# Patient Record
Sex: MALE | Race: WHITE | NOT HISPANIC OR LATINO | Employment: PART TIME | URBAN - METROPOLITAN AREA
[De-identification: names, ages, dates, MRNs, and addresses within clinical notes are randomized per-mention and may not be internally consistent; named-entity substitution may affect disease eponyms.]

---

## 2022-05-09 ENCOUNTER — HOSPITAL ENCOUNTER (OUTPATIENT)
Dept: RADIOLOGY | Facility: MEDICAL CENTER | Age: 62
End: 2022-05-09
Attending: UROLOGY
Payer: COMMERCIAL

## 2022-05-09 DIAGNOSIS — C61 PROSTATE CANCER (HCC): ICD-10-CM

## 2022-05-09 PROCEDURE — A9576 INJ PROHANCE MULTIPACK: HCPCS | Performed by: UROLOGY

## 2022-05-09 PROCEDURE — 72197 MRI PELVIS W/O & W/DYE: CPT

## 2022-05-09 PROCEDURE — 700111 HCHG RX REV CODE 636 W/ 250 OVERRIDE (IP): Performed by: RADIOLOGY

## 2022-05-09 PROCEDURE — 700117 HCHG RX CONTRAST REV CODE 255: Performed by: UROLOGY

## 2022-05-09 RX ADMIN — GADOTERIDOL 15 ML: 279.3 INJECTION, SOLUTION INTRAVENOUS at 17:38

## 2022-05-09 RX ADMIN — GLUCAGON 1 MG: 1 INJECTION, POWDER, LYOPHILIZED, FOR SOLUTION INTRAMUSCULAR; INTRAVENOUS at 16:59

## 2022-08-28 ENCOUNTER — HOSPITAL ENCOUNTER (OUTPATIENT)
Dept: RADIOLOGY | Facility: MEDICAL CENTER | Age: 62
End: 2022-08-28
Attending: UROLOGY
Payer: COMMERCIAL

## 2022-08-28 DIAGNOSIS — C61 MALIGNANT NEOPLASM OF PROSTATE (HCC): ICD-10-CM

## 2022-08-28 PROCEDURE — 72197 MRI PELVIS W/O & W/DYE: CPT

## 2022-08-28 PROCEDURE — A9576 INJ PROHANCE MULTIPACK: HCPCS | Performed by: PSYCHIATRY & NEUROLOGY

## 2022-08-28 PROCEDURE — 700111 HCHG RX REV CODE 636 W/ 250 OVERRIDE (IP): Performed by: RADIOLOGY

## 2022-08-28 PROCEDURE — 700117 HCHG RX CONTRAST REV CODE 255: Performed by: PSYCHIATRY & NEUROLOGY

## 2022-08-28 RX ADMIN — GADOTERIDOL 15 ML: 279.3 INJECTION, SOLUTION INTRAVENOUS at 09:00

## 2022-08-28 RX ADMIN — GLUCAGON 1 MG: 1 INJECTION, POWDER, LYOPHILIZED, FOR SOLUTION INTRAMUSCULAR; INTRAVENOUS at 07:48

## 2022-10-06 ENCOUNTER — HOSPITAL ENCOUNTER (OUTPATIENT)
Dept: RADIOLOGY | Facility: MEDICAL CENTER | Age: 62
End: 2022-10-06
Attending: INTERNAL MEDICINE
Payer: COMMERCIAL

## 2022-10-06 DIAGNOSIS — R07.9 CHEST PAIN, UNSPECIFIED TYPE: ICD-10-CM

## 2022-10-06 PROCEDURE — 700117 HCHG RX CONTRAST REV CODE 255: Performed by: INTERNAL MEDICINE

## 2022-10-06 PROCEDURE — A9577 INJ MULTIHANCE: HCPCS | Performed by: INTERNAL MEDICINE

## 2022-10-06 PROCEDURE — 75561 CARDIAC MRI FOR MORPH W/DYE: CPT

## 2022-10-06 RX ADMIN — GADOBENATE DIMEGLUMINE 15 ML: 529 INJECTION, SOLUTION INTRAVENOUS at 16:40

## 2023-09-16 ENCOUNTER — HOSPITAL ENCOUNTER (EMERGENCY)
Facility: MEDICAL CENTER | Age: 63
End: 2023-09-16
Attending: STUDENT IN AN ORGANIZED HEALTH CARE EDUCATION/TRAINING PROGRAM
Payer: COMMERCIAL

## 2023-09-16 VITALS
TEMPERATURE: 97.3 F | BODY MASS INDEX: 23.87 KG/M2 | OXYGEN SATURATION: 94 % | HEIGHT: 69 IN | WEIGHT: 161.16 LBS | SYSTOLIC BLOOD PRESSURE: 119 MMHG | RESPIRATION RATE: 15 BRPM | HEART RATE: 66 BPM | DIASTOLIC BLOOD PRESSURE: 73 MMHG

## 2023-09-16 DIAGNOSIS — H43.392 VITREOUS FLOATERS OF LEFT EYE: ICD-10-CM

## 2023-09-16 PROCEDURE — 99282 EMERGENCY DEPT VISIT SF MDM: CPT

## 2023-09-17 NOTE — ED PROVIDER NOTES
"CHIEF COMPLAINT  Chief Complaint   Patient presents with    Loss of Vision     Pt noticed floater in L eye  at 18:00 tonight and pt is now experiencing \"hazy vision\"        LIMITATION TO HISTORY   Select: None    HPI    Mayco Brandt is a 62 y.o. male who presents to the Emergency Department evaluation of floaters in his left eye.  He states around 6 PM he noticed that there was some dark spots in his left eye he felt like strands were falling in front of his eye, then they were at times swirling in front of his eye.  He denies any visual loss, no new eye pain, no trauma no falls.  He does wear glasses though denies any visual changes    OUTSIDE HISTORIAN(S):  Select: none    EXTERNAL RECORDS REVIEWED  Select: Other outpatient PCP visit 8/29/2023 patient has long COVID      PAST MEDICAL HISTORY  History reviewed. No pertinent past medical history.  .    SURGICAL HISTORY  History reviewed. No pertinent surgical history.      FAMILY HISTORY  History reviewed. No pertinent family history.       SOCIAL HISTORY  Social History     Socioeconomic History    Marital status: Single     Spouse name: Not on file    Number of children: Not on file    Years of education: Not on file    Highest education level: Not on file   Occupational History    Not on file   Tobacco Use    Smoking status: Never    Smokeless tobacco: Never   Substance and Sexual Activity    Alcohol use: Yes     Alcohol/week: 1.2 oz     Types: 2 Glasses of wine per week    Drug use: Not on file    Sexual activity: Not on file   Other Topics Concern    Not on file   Social History Narrative    Not on file     Social Determinants of Health     Financial Resource Strain: Not on file   Food Insecurity: Not on file   Transportation Needs: Not on file   Physical Activity: Not on file   Stress: Not on file   Social Connections: Not on file   Intimate Partner Violence: Not on file   Housing Stability: Not on file         CURRENT MEDICATIONS  No current " "facility-administered medications on file prior to encounter.     Current Outpatient Medications on File Prior to Encounter   Medication Sig Dispense Refill    levoFLOXacin (LEVAQUIN) 500 MG tablet              ALLERGIES  Allergies   Allergen Reactions    Mapap Sinus Maximum Strength [Eql Sinus Congestion-Pain]      Other reaction(s): Other (See Comments)  \"I just dont do well\"    Tylenol Sinus Max      Other reaction(s): Other (See Comments)  \"I just dont do well\" W/ tylenol only!       PHYSICAL EXAM  VITAL SIGNS:/73   Pulse 66   Temp 36.3 °C (97.3 °F) (Temporal)   Resp 15   Ht 1.753 m (5' 9\")   Wt 73.1 kg (161 lb 2.5 oz)   SpO2 94%   BMI 23.80 kg/m²       VITALS - vital signs documented prior to this note have been reviewed and noted,  GENERAL - awake, alert, oriented, GCS 15, no apparent distress, non-toxic  appearing  HEENT - normocephalic, atraumatic, pupils equal, sclera anicteric, mucus  membranes moist  Eye: Pupils are 3 and reactive bilaterally, there is no appreciable papilledema, cotton-wool spots, or hemorrhage on bedside funduscopic examination though this was inadequate as the pupils were not dilated.  I did perform a bedside ultrasound there is no evidence of underlying retinal detachment.  Visual acuities are 20/20 left 20/20 right 20/20 bilaterally with his correction  NECK - supple, no meningismus, full active range of motion, trachea midline  CARDIOVASCULAR - regular rate/rhythm, no murmurs/gallops/rubs  PULMONARY - no respiratory distress, speaking in full sentences, clear to  auscultation bilaterally, no wheezing/ronchi/rales, no accessory muscle use  GASTROINTESTINAL - soft, non-tender, non-distended, no rebound, guarding,      Radiologist interpretation:   No orders to display        COURSE & MEDICAL DECISION MAKING    ED COURSE:    ED Observation Status? No    Point of Care Ultrasound    ED POINT OF CARE ULTRASOUND: OCULAR    Indication: visual changes  Procedure:  Transverse and " longitudinal views of the left eye were obtained.  Evidence of retinal or vitreous detachment was not seen.  Vitreous hemorrhage was not seen.  Optic nerve sheath diameter (mm): 38    Image retained through [facility] as seen below:             Additional interpretation:    This study is a limited ultrasound examination performed and interpreted to evaluate for limited conditions as outlined above. There may be other clinically important information contained in the images that is outside this scope. When clinically warranted, a comprehensive ultrasound through the appropriate department is considered.         INITIAL ASSESSMENT, COURSE AND PLAN  Care Narrative: Patient presented for evaluation of visual changes feeling like spots were floating in front of his eyes.  He has no visual field deficits.  Visual acuities are intact in the emergency department.  Did perform a bedside ultrasound to evaluate for underlying retinal detachment versus vitreous detachment which did not reveal any evidence of retinal or vitreous detachment.  From his description these do seem consistent with floaters.  We will refer him to ophthalmology return precautions were discussed he was discharged in stable condition             ADDITIONAL PROBLEM LIST    DISPOSITION AND DISCUSSIONS    Escalation of care considered, and ultimately not performed:diagnostic imaging      FINAL DIAGNOSIS  1. Vitreous floaters of left eye             Electronically signed by: Tino Barber DO ,12:58 AM 09/16/23

## 2023-09-17 NOTE — ED TRIAGE NOTES
"Chief Complaint   Patient presents with    Loss of Vision     Pt noticed floater in L eye  at 18:00 tonight and pt is now experiencing \"hazy vision\"          Pt ambulated to triage for above complaint.  Pt is AO x 4, follows commands, and responds appropriately to questions. Patient's breathing is unlabored and pain is currently 0/10 on the 0-10 pain scale.  Pt placed in lobby. Patient educated on triage process and encouraged to alert staff for any changes.      /65   Pulse 78   Temp 36.3 °C (97.4 °F) (Temporal)   Resp 16   Ht 1.753 m (5' 9\")   Wt 73.1 kg (161 lb 2.5 oz)   SpO2 98%     "

## 2023-09-17 NOTE — DISCHARGE INSTRUCTIONS
If you develop any visual loss need to return immediately to the nearest emergency department if you develop any severe eye pain redness of the eye return to the ER or follow-up with ophthalmology return with other concerns

## 2023-09-17 NOTE — ED NOTES
Visual acuity:    20:40 Left Eye (affected eye) with corrective lens.  20:25 Right Eye with corrective lens.  20:25 Both Eyes with corrective lens.